# Patient Record
Sex: FEMALE | Race: OTHER | URBAN - METROPOLITAN AREA
[De-identification: names, ages, dates, MRNs, and addresses within clinical notes are randomized per-mention and may not be internally consistent; named-entity substitution may affect disease eponyms.]

---

## 2017-11-29 ENCOUNTER — OFFICE VISIT (OUTPATIENT)
Dept: FAMILY MEDICINE CLINIC | Age: 27
End: 2017-11-29

## 2017-11-29 VITALS
HEIGHT: 63 IN | WEIGHT: 129.4 LBS | HEART RATE: 67 BPM | BODY MASS INDEX: 22.93 KG/M2 | DIASTOLIC BLOOD PRESSURE: 75 MMHG | TEMPERATURE: 98.9 F | SYSTOLIC BLOOD PRESSURE: 110 MMHG

## 2017-11-29 DIAGNOSIS — Z13.9 ENCOUNTER FOR SCREENING: ICD-10-CM

## 2017-11-29 DIAGNOSIS — J02.9 VIRAL PHARYNGITIS: Primary | ICD-10-CM

## 2017-11-29 DIAGNOSIS — J06.9 VIRAL UPPER RESPIRATORY ILLNESS: ICD-10-CM

## 2017-11-29 LAB
S PYO AG THROAT QL: NEGATIVE
VALID INTERNAL CONTROL?: YES

## 2017-11-29 RX ORDER — GUAIFENESIN 600 MG/1
600 TABLET, EXTENDED RELEASE ORAL 2 TIMES DAILY
Qty: 30 TAB | Refills: 0 | Status: SHIPPED | OUTPATIENT
Start: 2017-11-29

## 2017-11-29 NOTE — PROGRESS NOTES
Nadiya Squires is a 32 y.o. female    Issues discussed today include:    Chief Complaint   Patient presents with    Sore Throat       1) Sore throat, nausea:  Started 5 days ago, endorses frequent cough that is keeping her up at night. Productive for \"white balls\" of mucous, feels it in her tonsils. Endorsing sore throat, subjective fever and chills. Fevers in the mornings and at night, did not take temperature at home. Endorses nausea without vomiting, no diarrhea. No sick contacts. 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 Sneezing as well with nasal ongestion. Has taken tylenol and cough syrup, has helped her symptoms. Patient has prior hx of mononucleosis when in [facility] - says it was terrible, couldn't eat for 1 week. Data reviewed or ordered today:       Other problems include: There is no problem list on file for this patient. Medications:  No current outpatient prescriptions on file prior to visit. No current facility-administered medications on file prior to visit. Allergies:  No Known Allergies    LMP:  Patient's last menstrual period was 11/18/2017 (exact date). Social History     Social History    Marital status: SINGLE     Spouse name: N/A    Number of children: N/A    Years of education: N/A     Occupational History    Not on file. Social History Main Topics    Smoking status: Never Smoker    Smokeless tobacco: Never Used    Alcohol use No    Drug use: No    Sexual activity: Not on file     Other Topics Concern    Not on file     Social History Narrative    No narrative on file       No family history on file.       Physical Exam   Visit Vitals    /75 (BP 1 Location: Left arm, BP Patient Position: Sitting)    Pulse 67    Temp 98.9 °F (37.2 °C) (Oral)    Ht 5' 3.43\" (1.611 m)    Wt 129 lb 6.4 oz (58.7 kg)    LMP 11/18/2017 (Exact Date)    BMI 22.62 kg/m2      BP Readings from Last 3 Encounters:   11/29/17 110/75     Constitutional: Appears well,  No acute distress, Vitals noted  Psychiatric:  Affect normal, Alert and Oriented to person/place/time  Eyes:  Conjunctiva clear, no drainage  ENT:  External ears and nose normal, Mucous membranes moist. Enlarged tonsils with erythema, no blisters or exudate present  Neck:  General inspection normal. Supple. + subcentimeter R anterior cervical lymphadenopathy and L post cervical lymphadenopathy   Heart:  Normal HR, Normal S1 and S2,  Regular rhythm. No murmurs, rubs or gallops. Lungs:  Clear to auscultation, good respiratory effort, no wheezes, rales or rhonchi  Abdomen: Soft, nontender, no HSM  Extremities: Without edema, good peripheral pulses  Skin:  Warm to palpation, without rashes      POC Rapid Strep A Test: Negative    Assessment/Plan:      ICD-10-CM ICD-9-CM    1. Viral pharyngitis J02.9 462    2. Viral upper respiratory illness J06.9 465.9     B97.89     3. Encounter for screening Z13.9 V82.9 AMB POC RAPID STREP A     Rapid strep negative  Likely viral pharyngitis  Fluids, pain and symptom control   Return if sxs improve and then worsen to r/o superimposed bacterial infection, or if no better in 5 days  Mucinex bid prn    Follow-up Disposition:  Return in about 5 days (around 12/4/2017), or if symptoms worsen or fail to improve.         Geetha Nogueira MD  [address] [place] [address]

## 2017-11-29 NOTE — PATIENT INSTRUCTIONS
Dolor de garganta: Instrucciones de cuidado - [ Sore Throat: Care Instructions ]  Instrucciones de cuidado    Alba infección por un virus o alba bacteria causa la mayoría de los andres de garganta. El humo del cigarrillo, el aire seco, el aire contaminado, las [place] y gritar también pueden causar dolor de garganta. El dolor de garganta puede ser intenso y [place]. Por kashif, la mayoría de los andres de garganta desaparecen por sí mismos. Si tiene [employer], el médico podría recetarle antibióticos. La atención de seguimiento es alba parte clave de browning tratamiento y seguridad. Asegúrese de hacer y acudir a todas las citas, y llame a browning médico si está teniendo problemas. También es alba buena idea saber los resultados de los exámenes y mantener alba lista de los medicamentos que judith. ¿Cómo puede cuidarse en el hogar? · Si browning médico le recetó antibióticos, tómelos según las indicaciones. No deje de tomarlos por el hecho de sentirse mejor. Debe eun todos los antibióticos hasta terminarlos. · Ivett gárgaras de agua salada tibia alba vez cada hora para ayudar a reducir la hinchazón y aliviar la molestia. Mezcle 1 cucharadita de sal en 1 taza de agua tibia. · Epps un analgésico (medicamento para el dolor) de venta andrew, bree acetaminofén (Tylenol), ibuprofeno (Advil, Motrin) o naproxeno (Aleve). Shanell y siga todas las instrucciones de la [place]. · Tenga cuidado cuando tome medicamentos de venta andrew para el resfriado o la gripe y Tylenol al [employer]. Muchos de estos medicamentos contienen acetaminofén, o sea, Tylenol. Shanell las etiquetas para asegurarse de que no está tomando alba dosis mayor que la recomendada. El exceso de acetaminofén (Tylenol) puede ser dañino. · Epps abundantes líquidos. Los líquidos podrían ayudar a aliviar la irritación de la garganta. Beber líquidos calientes, bree té o sopa, podría ayudarle a reducir el dolor de garganta.   · Chupe pastillas para la garganta de venta andrew para aliviar el dolor. Los caramelos duros o los caramelos regulares para la tos también podrían ayudar. Nunca se los dé a un laura pequeño porque corre el riesgo de atragantarse. · No fume ni permita que otros fumen cerca de usted. Si necesita ayuda para dejar de fumar, hable con browning médico [employer] y medicamentos para dejar de fumar. Estos pueden aumentar seun probabilidades de dejar el hábito para siempre. · Use un humidificador o vaporizador para añadir humedad en browning dormitorio. Siga las instrucciones para limpiar el aparato. ¿Cuándo debe pedir ayuda? Llame a browning médico ahora mismo o busque atención médica inmediata si:  ? · Tiene dificultades para tragar o Gilbert Bihari. ? · El dolor de garganta empeora mucho en un lado. ?Preste especial atención a los cambios en browning katya y asegúrese de comunicarse con browning médico si no mejora bree se esperaba. ¿Dónde puede encontrar más información en inglés? Jennifer Chelsea a [email or web address] [place] Stevie [id number] en la búsqueda para aprender más acerca de \"Dolor de garganta: Instrucciones de cuidado - [ Sore Throat: Care Instructions ]. \"  Revisado: 12 [place], 2017  Versión del contenido: 11.4  © 1843-1809 Healthwise, Incorporated. Las instrucciones de cuidado fueron adaptadas bajo licencia por [employer] (which disclaims liability or warranty for this information). Si usted tiene [place] afección médica o sobre estas instrucciones, siempre pregunte a browning profesional de katya. Healthwise, Incorporated niega toda garantía o responsabilidad por browning uso de esta información. Infecciones virales: Instrucciones de cuidado - [ Viral Infections: Care Instructions ]  Instrucciones de cuidado    Usted no se siente rena, wilber no está elizabeth qué lo está causando.  Podría tener alba infección viral. Los virus provocan muchas enfermedades, bree el resfriado común, influenza, fiebre, salpullidos, y [address], las náuseas y el vómito que suelen llamarse \"gastroenteritis viral\". Es posible que se pregunte si los medicamentos antibióticos pueden ayudarle a sentirse mejor. Sherin los antibióticos tratan únicamente infecciones causadas por bacterias. No funcionan para los virus. La buena noticia es que las infecciones virales generalmente no son graves. La mayoría desaparecen en unos pocos días sin tratamiento médico. Mientras tanto, hay algunas cosas que usted puede hacer para estar más cómodo. La atención de seguimiento es alba parte clave de browning tratamiento y seguridad. Asegúrese de hacer y acudir a todas las citas, y llame a browning médico si está teniendo problemas. También es alba buena idea saber los resultados de los exámenes y mantener alba lista de los medicamentos que judith. ¿Cómo puede cuidarse en el hogar? · Descanse lo suficiente si se siente agotado. · [place] un analgésico (medicamento para el dolor) de venta andrew, bree acetaminofén (Tylenol), ibuprofeno (Advil, Motrin) o naproxeno (Aleve), si es necesario. Shanell y siga todas las instrucciones de la [place]. · Tenga cuidado cuando tome medicamentos de venta andrew para el resfriado o la gripe y Tylenol al [employer]. Muchos de estos medicamentos contienen acetaminofén, o sea, Tylenol. Shanell las etiquetas para asegurarse de que no esté tomando alba dosis mayor que la recomendada. El exceso de acetaminofén (Tylenol) puede ser dañino. · Catarina abundantes líquidos, suficientes para que browning orina sea de color amarillo elizabeth o raya bree el agua. Si tiene alba enfermedad del riñón, del corazón o del hígado y tiene que Ivan's líquidos, hable con browning médico antes de aumentar browning consumo. · Cuando tenga fiebre, no vaya al [place], a la escuela ni a otros lugares públicos. ¿Cuándo debe pedir ayuda? Llame al 911 en cualquier momento que considere que necesita atención de emergencia. Por ejemplo, llame si:  ? · Tiene dificultad grave para respirar. ? · Se desmayó (perdió el conocimiento).    ?Llame a browning médico ahora mismo o busque atención médica inmediata si:  ? · Le parece que está mucho más enfermo. ? · Tiene fiebre nueva o más linus. ? · Tiene kathy en las heces. ? · Tiene dolor de estómago nuevo o que empeora. ? · Tiene un nuevo salpullido. ?Preste especial atención a los cambios en browning katya y asegúrese de comunicarse con browning médico si:  ? · Yasmine Post a mejorar y Max Showers. ? · No mejora bree se esperaba. ¿Dónde puede encontrar más información en inglés? Daily segundo [email or web address] Rayna Stephen M011 en la búsqueda para aprender más acerca de \"Infecciones virales: Instrucciones de cuidado - [ Viral Infections: Care Instructions ]. \"  Revisado: 3 [address] 2017  Versión del contenido: 11.4  © 3087-6394 Healthwise, Incorporated. Las instrucciones de cuidado fueron adaptadas bajo licencia por [employer] (which disclaims liability or warranty for this information). Si usted tiene [place] afección médica o sobre estas instrucciones, siempre pregunte a browning profesional de katya. [employer] niega toda garantía o responsabilidad por browinng uso de esta información.

## 2017-11-29 NOTE — PROGRESS NOTES
Results for orders placed or performed in visit on 11/29/17   AMB POC RAPID STREP A   Result Value Ref Range    VALID INTERNAL CONTROL POC Yes     Group A Strep Ag Negative Negative